# Patient Record
Sex: MALE
[De-identification: names, ages, dates, MRNs, and addresses within clinical notes are randomized per-mention and may not be internally consistent; named-entity substitution may affect disease eponyms.]

---

## 2019-06-06 PROBLEM — Z00.00 ENCOUNTER FOR PREVENTIVE HEALTH EXAMINATION: Status: ACTIVE | Noted: 2019-06-06

## 2019-06-11 ENCOUNTER — APPOINTMENT (OUTPATIENT)
Dept: OTOLARYNGOLOGY | Facility: CLINIC | Age: 34
End: 2019-06-11
Payer: COMMERCIAL

## 2019-06-11 VITALS — WEIGHT: 210 LBS | HEIGHT: 71 IN | BODY MASS INDEX: 29.4 KG/M2

## 2019-06-11 VITALS
TEMPERATURE: 98.6 F | DIASTOLIC BLOOD PRESSURE: 92 MMHG | SYSTOLIC BLOOD PRESSURE: 145 MMHG | HEART RATE: 93 BPM | RESPIRATION RATE: 14 BRPM | OXYGEN SATURATION: 98 %

## 2019-06-11 DIAGNOSIS — R06.83 SNORING: ICD-10-CM

## 2019-06-11 DIAGNOSIS — G47.33 OBSTRUCTIVE SLEEP APNEA (ADULT) (PEDIATRIC): ICD-10-CM

## 2019-06-11 DIAGNOSIS — J34.89 OTHER SPECIFIED DISORDERS OF NOSE AND NASAL SINUSES: ICD-10-CM

## 2019-06-11 DIAGNOSIS — Z82.49 FAMILY HISTORY OF ISCHEMIC HEART DISEASE AND OTHER DISEASES OF THE CIRCULATORY SYSTEM: ICD-10-CM

## 2019-06-11 DIAGNOSIS — J30.9 ALLERGIC RHINITIS, UNSPECIFIED: ICD-10-CM

## 2019-06-11 DIAGNOSIS — Z78.9 OTHER SPECIFIED HEALTH STATUS: ICD-10-CM

## 2019-06-11 DIAGNOSIS — Z83.3 FAMILY HISTORY OF DIABETES MELLITUS: ICD-10-CM

## 2019-06-11 DIAGNOSIS — J45.909 UNSPECIFIED ASTHMA, UNCOMPLICATED: ICD-10-CM

## 2019-06-11 DIAGNOSIS — R09.82 POSTNASAL DRIP: ICD-10-CM

## 2019-06-11 PROCEDURE — 99203 OFFICE O/P NEW LOW 30 MIN: CPT | Mod: 25

## 2019-06-11 PROCEDURE — 31575 DIAGNOSTIC LARYNGOSCOPY: CPT

## 2019-06-11 RX ORDER — MOMETASONE 50 UG/1
50 SPRAY, METERED NASAL TWICE DAILY
Qty: 1 | Refills: 11 | Status: ACTIVE | COMMUNITY
Start: 2019-06-11 | End: 1900-01-01

## 2019-06-11 NOTE — PROCEDURE
[Congested] : congested [Image(s) Captured] : image(s) captured and filed [Topical Lidocaine] : topical lidocaine [Flexible Endoscope] : examined with the flexible endoscope [Serial Number: ___] : Serial Number: [unfilled] [Allergic] : allergic signs [Deviated to the Rt] : deviated to the right [Cauterized w/Silver Nitrate] : the bleeding was cauterized with silver nitrate [FreeTextEntry6] : DNS\par retroplatal obstr [FreeTextEntry2] : Mild bleeding  [de-identified] : Mild Deviated Nasal Septum and Turbinate Hypertrophy \par

## 2019-06-11 NOTE — REASON FOR VISIT
[Initial Evaluation] : an initial evaluation for [Friend] : friend [FreeTextEntry2] : persistent nasal congestion, dry mouth and snoring for the past 6 to 7 years.  Patient states his level of severity is a 7 out of 10 and it occurs constant.  Patient states nothing helps to improve or worsens his

## 2019-06-11 NOTE — HISTORY OF PRESENT ILLNESS
[de-identified] : 33 years old male patient with history of persistent nasal congestion, dry mouth and snoring for the past 6 to 7 years.   Patient is present today in the office with C/O Snoring loud enough to disturb his sleep or that of others.  Waking up gasping or choking.  Intermittent pauses in her breathing during sleep.  Mild Deviated Nasal Septum and Turbinate Hypertrophy.  Allergic Rhinitis \par

## 2019-06-12 ENCOUNTER — OTHER (OUTPATIENT)
Age: 34
End: 2019-06-12

## 2019-06-13 PROBLEM — J45.909 CHILDHOOD ASTHMA: Status: RESOLVED | Noted: 2019-06-13 | Resolved: 2019-06-13

## 2019-06-13 PROBLEM — Z83.3 FAMILY HISTORY OF DIABETES MELLITUS: Status: ACTIVE | Noted: 2019-06-13

## 2019-06-13 PROBLEM — Z78.9 NON-SMOKER: Status: ACTIVE | Noted: 2019-06-13

## 2019-06-13 PROBLEM — Z82.49 FAMILY HISTORY OF HYPERTENSION: Status: ACTIVE | Noted: 2019-06-13

## 2019-06-13 PROBLEM — Z78.9 CAFFEINE USE: Status: ACTIVE | Noted: 2019-06-13

## 2019-11-20 ENCOUNTER — APPOINTMENT (OUTPATIENT)
Dept: OTOLARYNGOLOGY | Facility: CLINIC | Age: 34
End: 2019-11-20
Payer: COMMERCIAL

## 2019-11-20 VITALS
OXYGEN SATURATION: 100 % | TEMPERATURE: 98.6 F | SYSTOLIC BLOOD PRESSURE: 147 MMHG | DIASTOLIC BLOOD PRESSURE: 86 MMHG | HEART RATE: 89 BPM

## 2019-11-20 VITALS — WEIGHT: 195 LBS | BODY MASS INDEX: 27.3 KG/M2 | HEIGHT: 71 IN

## 2019-11-20 DIAGNOSIS — H93.19 TINNITUS, UNSPECIFIED EAR: ICD-10-CM

## 2019-11-20 DIAGNOSIS — Z78.9 OTHER SPECIFIED HEALTH STATUS: ICD-10-CM

## 2019-11-20 DIAGNOSIS — H91.90 UNSPECIFIED HEARING LOSS, UNSPECIFIED EAR: ICD-10-CM

## 2019-11-20 DIAGNOSIS — H61.23 IMPACTED CERUMEN, BILATERAL: ICD-10-CM

## 2019-11-20 PROCEDURE — 92550 TYMPANOMETRY & REFLEX THRESH: CPT

## 2019-11-20 PROCEDURE — 99213 OFFICE O/P EST LOW 20 MIN: CPT | Mod: 25

## 2019-11-20 PROCEDURE — 92557 COMPREHENSIVE HEARING TEST: CPT

## 2019-11-20 PROCEDURE — 69210 REMOVE IMPACTED EAR WAX UNI: CPT

## 2019-11-20 NOTE — PHYSICAL EXAM
[de-identified] : b copious cerumen impactions removed atraumatically with suction [Normal] : no masses and lesions seen, face is symmetric

## 2019-11-20 NOTE — HISTORY OF PRESENT ILLNESS
[de-identified] : 33 yo man who is a usual patient of Dr Alberts (I am covering while he is away) with a 3 d h/o r sudden hl 5/10 in severity. He has not had this in the past. Is a qtip user. has dull fullness in the r ear and continuous high pitched ringing.

## 2019-11-20 NOTE — PROCEDURE
[Cerumen Impaction] : Cerumen Impaction [Same] : same as the Pre Op Dx. [FreeTextEntry6] : b copious cerumen impactions removed atraumatically with suction [] : Removal of Cerumen